# Patient Record
Sex: FEMALE | Race: OTHER | NOT HISPANIC OR LATINO | URBAN - METROPOLITAN AREA
[De-identification: names, ages, dates, MRNs, and addresses within clinical notes are randomized per-mention and may not be internally consistent; named-entity substitution may affect disease eponyms.]

---

## 2018-06-15 ENCOUNTER — OUTPATIENT (OUTPATIENT)
Dept: OUTPATIENT SERVICES | Facility: HOSPITAL | Age: 58
LOS: 1 days | End: 2018-06-15
Payer: COMMERCIAL

## 2018-06-15 VITALS
WEIGHT: 149.91 LBS | SYSTOLIC BLOOD PRESSURE: 150 MMHG | RESPIRATION RATE: 12 BRPM | HEIGHT: 60 IN | DIASTOLIC BLOOD PRESSURE: 80 MMHG | TEMPERATURE: 97 F | OXYGEN SATURATION: 98 % | HEART RATE: 67 BPM

## 2018-06-15 DIAGNOSIS — D25.9 LEIOMYOMA OF UTERUS, UNSPECIFIED: ICD-10-CM

## 2018-06-15 DIAGNOSIS — N85.9 NONINFLAMMATORY DISORDER OF UTERUS, UNSPECIFIED: ICD-10-CM

## 2018-06-15 DIAGNOSIS — K21.9 GASTRO-ESOPHAGEAL REFLUX DISEASE WITHOUT ESOPHAGITIS: ICD-10-CM

## 2018-06-15 LAB
BLD GP AB SCN SERPL QL: NEGATIVE — SIGNIFICANT CHANGE UP
BUN SERPL-MCNC: 13 MG/DL — SIGNIFICANT CHANGE UP (ref 7–23)
CALCIUM SERPL-MCNC: 8.8 MG/DL — SIGNIFICANT CHANGE UP (ref 8.4–10.5)
CHLORIDE SERPL-SCNC: 103 MMOL/L — SIGNIFICANT CHANGE UP (ref 98–107)
CO2 SERPL-SCNC: 25 MMOL/L — SIGNIFICANT CHANGE UP (ref 22–31)
CREAT SERPL-MCNC: 0.88 MG/DL — SIGNIFICANT CHANGE UP (ref 0.5–1.3)
GLUCOSE SERPL-MCNC: 121 MG/DL — HIGH (ref 70–99)
HCG SERPL-ACNC: < 5 MIU/ML — SIGNIFICANT CHANGE UP
HCT VFR BLD CALC: 33.8 % — LOW (ref 34.5–45)
HGB BLD-MCNC: 11.2 G/DL — LOW (ref 11.5–15.5)
MCHC RBC-ENTMCNC: 23.5 PG — LOW (ref 27–34)
MCHC RBC-ENTMCNC: 33.1 % — SIGNIFICANT CHANGE UP (ref 32–36)
MCV RBC AUTO: 70.9 FL — LOW (ref 80–100)
NRBC # FLD: 0 — SIGNIFICANT CHANGE UP
PLATELET # BLD AUTO: 144 K/UL — LOW (ref 150–400)
PMV BLD: SIGNIFICANT CHANGE UP FL (ref 7–13)
POTASSIUM SERPL-MCNC: 3.7 MMOL/L — SIGNIFICANT CHANGE UP (ref 3.5–5.3)
POTASSIUM SERPL-SCNC: 3.7 MMOL/L — SIGNIFICANT CHANGE UP (ref 3.5–5.3)
RBC # BLD: 4.77 M/UL — SIGNIFICANT CHANGE UP (ref 3.8–5.2)
RBC # FLD: 15.9 % — HIGH (ref 10.3–14.5)
RH IG SCN BLD-IMP: POSITIVE — SIGNIFICANT CHANGE UP
SODIUM SERPL-SCNC: 139 MMOL/L — SIGNIFICANT CHANGE UP (ref 135–145)
WBC # BLD: 8.73 K/UL — SIGNIFICANT CHANGE UP (ref 3.8–10.5)
WBC # FLD AUTO: 8.73 K/UL — SIGNIFICANT CHANGE UP (ref 3.8–10.5)

## 2018-06-15 PROCEDURE — 93010 ELECTROCARDIOGRAM REPORT: CPT

## 2018-06-15 RX ORDER — SODIUM CHLORIDE 9 MG/ML
1000 INJECTION, SOLUTION INTRAVENOUS
Qty: 0 | Refills: 0 | Status: DISCONTINUED | OUTPATIENT
Start: 2018-06-22 | End: 2018-06-25

## 2018-06-15 NOTE — H&P PST ADULT - VISION (WITH CORRECTIVE LENSES IF THE PATIENT USUALLY WEARS THEM):
Partially impaired: cannot see medication labels or newsprint, but can see obstacles in path, and the surrounding layout; can count fingers at arm's length/reading glasses
Partially impaired: cannot see medication labels or newsprint, but can see obstacles in path, and the surrounding layout; can count fingers at arm's length/glasses

## 2018-06-15 NOTE — H&P PST ADULT - ASSESSMENT
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

## 2018-06-15 NOTE — H&P PST ADULT - LAB RESULTS AND INTERPRETATION
cbc, bmp, hcg, type & screen done 6/15/18 cbc,  hcg, type & screen done 6/15/18  bmp ; " borderline htn' cbc, bmp,  hcg, type & screen done 6/15/18

## 2018-06-15 NOTE — H&P PST ADULT - INTERPRETATION SERVICES DECLINED
Patient/Caregiver requests family/friend to interpret.
Patient/Caregiver requests family/friend to interpret.

## 2018-06-15 NOTE — H&P PST ADULT - PROBLEM SELECTOR PLAN 1
Procedure as booked ; Abdominal Myomectomy ; pt and daughter states also " surgery on my ovary'  call to surgeon ; s/w Ms Wills; Ms Johann states  procedure is correct as booked. pt and daughter instructed to call surgeon 6/18/18 with questions r/t surgery     pre op instructions reviewed with pt and daughter ; both appear to have a good understanding of pre op instructions

## 2018-06-15 NOTE — H&P PST ADULT - PMH
Anemia    Fibroids    GERD (gastroesophageal reflux disease) Anemia    Endometrial hyperplasia, simple    Fibroids    GERD (gastroesophageal reflux disease)

## 2018-06-15 NOTE — H&P PST ADULT - HISTORY OF PRESENT ILLNESS
Pt is a Pt is a ; information obtained with assistance of daughter; daughter reports menses ; irregular ; flow heavy ; h/o anemia ; pt to surgeon ;a/w/u was done ;there are fibroids ; " they have increased in size  pt now presents for surgery Pt is a 58 y.o. female ; pt primary language is [facility] ;  information obtained with assistance of daughter; daughter reports menses ; irregular ; flow heavy ; h/o anemia ; pt to surgeon ;a/w/u was done ;there are fibroids ; " they have increased in size  pt now presents for surgery ; see plan of care

## 2018-06-15 NOTE — H&P PST ADULT - ATTENDING COMMENTS
patient presented to office with postmenopausal bleeding of several months. Pelvic ultrasound results shows fibroid uterus and Endometrial biopsy reveals endometrial hyperplasia. Pathology results discussed in detail. Planned procedure is JODY/BSO as patient requests definitive therapy. Risks and benefits and alternatives discussed with patient and daughter who is . All questions and concerns addressed to full satisfaction. Anticipate uncomplicated intraop and postop course. MBeagerson

## 2018-06-15 NOTE — H&P PST ADULT - PROBLEM SELECTOR PLAN 2
pt to take omeprazole dos with sip water     Emphasized with pt and daughter " NPO after 11 pm; pt also instructed to remove all piercing pre op

## 2018-06-21 ENCOUNTER — TRANSCRIPTION ENCOUNTER (OUTPATIENT)
Age: 58
End: 2018-06-21

## 2018-06-22 ENCOUNTER — INPATIENT (INPATIENT)
Facility: HOSPITAL | Age: 58
LOS: 2 days | Discharge: HOME | End: 2018-06-25
Attending: OBSTETRICS & GYNECOLOGY | Admitting: OBSTETRICS & GYNECOLOGY
Payer: COMMERCIAL

## 2018-06-22 ENCOUNTER — TRANSCRIPTION ENCOUNTER (OUTPATIENT)
Age: 58
End: 2018-06-22

## 2018-06-22 ENCOUNTER — RESULT REVIEW (OUTPATIENT)
Age: 58
End: 2018-06-22

## 2018-06-22 VITALS
OXYGEN SATURATION: 97 % | SYSTOLIC BLOOD PRESSURE: 156 MMHG | RESPIRATION RATE: 17 BRPM | TEMPERATURE: 98 F | HEART RATE: 69 BPM | HEIGHT: 60 IN | WEIGHT: 149.91 LBS | DIASTOLIC BLOOD PRESSURE: 79 MMHG

## 2018-06-22 DIAGNOSIS — N85.9 NONINFLAMMATORY DISORDER OF UTERUS, UNSPECIFIED: ICD-10-CM

## 2018-06-22 LAB
GLUCOSE BLDC GLUCOMTR-MCNC: 97 MG/DL — SIGNIFICANT CHANGE UP (ref 70–99)
HCG UR QL: NEGATIVE — SIGNIFICANT CHANGE UP
RH IG SCN BLD-IMP: POSITIVE — SIGNIFICANT CHANGE UP

## 2018-06-22 PROCEDURE — 88342 IMHCHEM/IMCYTCHM 1ST ANTB: CPT | Mod: 26

## 2018-06-22 PROCEDURE — 88341 IMHCHEM/IMCYTCHM EA ADD ANTB: CPT | Mod: 26

## 2018-06-22 PROCEDURE — 88307 TISSUE EXAM BY PATHOLOGIST: CPT | Mod: 26

## 2018-06-22 RX ORDER — IBUPROFEN 200 MG
3 TABLET ORAL
Qty: 0 | Refills: 0 | COMMUNITY

## 2018-06-22 RX ORDER — METOCLOPRAMIDE HCL 10 MG
10 TABLET ORAL ONCE
Qty: 0 | Refills: 0 | Status: DISCONTINUED | OUTPATIENT
Start: 2018-06-22 | End: 2018-06-23

## 2018-06-22 RX ORDER — SODIUM CHLORIDE 9 MG/ML
1000 INJECTION, SOLUTION INTRAVENOUS
Qty: 0 | Refills: 0 | Status: DISCONTINUED | OUTPATIENT
Start: 2018-06-22 | End: 2018-06-25

## 2018-06-22 RX ORDER — ACETAMINOPHEN 500 MG
975 TABLET ORAL EVERY 6 HOURS
Qty: 0 | Refills: 0 | Status: DISCONTINUED | OUTPATIENT
Start: 2018-06-22 | End: 2018-06-25

## 2018-06-22 RX ORDER — HYDROMORPHONE HYDROCHLORIDE 2 MG/ML
30 INJECTION INTRAMUSCULAR; INTRAVENOUS; SUBCUTANEOUS
Qty: 0 | Refills: 0 | Status: DISCONTINUED | OUTPATIENT
Start: 2018-06-22 | End: 2018-06-23

## 2018-06-22 RX ORDER — OMEPRAZOLE 10 MG/1
1 CAPSULE, DELAYED RELEASE ORAL
Qty: 0 | Refills: 0 | COMMUNITY

## 2018-06-22 RX ORDER — NALOXONE HYDROCHLORIDE 4 MG/.1ML
0.1 SPRAY NASAL
Qty: 0 | Refills: 0 | Status: DISCONTINUED | OUTPATIENT
Start: 2018-06-22 | End: 2018-06-23

## 2018-06-22 RX ORDER — ONDANSETRON 8 MG/1
4 TABLET, FILM COATED ORAL ONCE
Qty: 0 | Refills: 0 | Status: DISCONTINUED | OUTPATIENT
Start: 2018-06-22 | End: 2018-06-23

## 2018-06-22 RX ORDER — HYDROMORPHONE HYDROCHLORIDE 2 MG/ML
0.5 INJECTION INTRAMUSCULAR; INTRAVENOUS; SUBCUTANEOUS
Qty: 0 | Refills: 0 | Status: DISCONTINUED | OUTPATIENT
Start: 2018-06-22 | End: 2018-06-23

## 2018-06-22 RX ORDER — KETOROLAC TROMETHAMINE 30 MG/ML
30 SYRINGE (ML) INJECTION EVERY 6 HOURS
Qty: 0 | Refills: 0 | Status: DISCONTINUED | OUTPATIENT
Start: 2018-06-22 | End: 2018-06-23

## 2018-06-22 RX ORDER — DIPHENHYDRAMINE HCL 50 MG
25 CAPSULE ORAL EVERY 4 HOURS
Qty: 0 | Refills: 0 | Status: DISCONTINUED | OUTPATIENT
Start: 2018-06-22 | End: 2018-06-23

## 2018-06-22 RX ORDER — ONDANSETRON 8 MG/1
4 TABLET, FILM COATED ORAL EVERY 6 HOURS
Qty: 0 | Refills: 0 | Status: DISCONTINUED | OUTPATIENT
Start: 2018-06-22 | End: 2018-06-23

## 2018-06-22 RX ORDER — HEPARIN SODIUM 5000 [USP'U]/ML
5000 INJECTION INTRAVENOUS; SUBCUTANEOUS EVERY 12 HOURS
Qty: 0 | Refills: 0 | Status: DISCONTINUED | OUTPATIENT
Start: 2018-06-22 | End: 2018-06-25

## 2018-06-22 RX ORDER — FENTANYL CITRATE 50 UG/ML
25 INJECTION INTRAVENOUS
Qty: 0 | Refills: 0 | Status: DISCONTINUED | OUTPATIENT
Start: 2018-06-22 | End: 2018-06-23

## 2018-06-22 RX ADMIN — SODIUM CHLORIDE 125 MILLILITER(S): 9 INJECTION, SOLUTION INTRAVENOUS at 19:14

## 2018-06-22 RX ADMIN — HYDROMORPHONE HYDROCHLORIDE 30 MILLILITER(S): 2 INJECTION INTRAMUSCULAR; INTRAVENOUS; SUBCUTANEOUS at 21:18

## 2018-06-22 RX ADMIN — Medication 30 MILLIGRAM(S): at 23:40

## 2018-06-22 RX ADMIN — Medication 975 MILLIGRAM(S): at 23:40

## 2018-06-22 NOTE — DISCHARGE NOTE ADULT - INSTRUCTIONS
Notify MD if there is fever >101, chills, increase pain unrelieved by pain meds, nausea/vomiting or unable to tolerate foods.  Showering allowed but do not submerge incision under water.   Drink 6-8 glasses of water daily.

## 2018-06-22 NOTE — DISCHARGE NOTE ADULT - CARE PROVIDER_API CALL
Ayanna Mccray (DO), Obstetrics and Gynecology  [address] [place] [address]  Phone: [phone]  Fax: [phone]

## 2018-06-22 NOTE — DISCHARGE NOTE ADULT - CONDITIONS AT DISCHARGE
Vitals stable, tolerated regular diet and voiding without difficulty.  Abd lap site clean, dry and intact.   Pt verbalize all discharge and medication instructions including the need for MD follow up visit.

## 2018-06-22 NOTE — DISCHARGE NOTE ADULT - PATIENT PORTAL LINK FT
You can access the [facility], offered by [facility], by registering with the following website: [email or web address]

## 2018-06-22 NOTE — ASU PATIENT PROFILE, ADULT - INTERPRETER NAME
MSD Chrystal [id number].......

## 2018-06-22 NOTE — ASU PREOP CHECKLIST - 1.
warm blanket given  0.0.0.0.0.0.0.0.0.0.0.0.0.0.0.0.0.0.0.0.0.0.0.0.0.0.0.0.0.0.0.0.0.0.0.0.0.0.0.0.0.0.0.0.0.0.0.0.0.0.0.0.0.0.0.0.0.0.0.0.0.0.0.0.0.0.0.0.0.0.0.0.0.0.0.0.0.0.0.0.0.0.0.0.0.0.0.0.0.0.0.0.0.0.0.0.0.0.0.0.0.0.0.0.0.0.0.0.0.0.0.0.0.0.0.0.0.0.0.0.0.0.0.0.0.0.0.0.0.0.0.0.0.0.0.0.0.0.0.0.0.0.0.0.0.0.0.0.0.0.0.0.0.0.0.0.0.0.0.0.0.0.0.0.0.0.

## 2018-06-22 NOTE — DISCHARGE NOTE ADULT - HOSPITAL COURSE
Patient was admitted for scheduled JODY and BSO.  Please see operative report for details.  The procedure was uncomplicated with an EBL of 650 and the patient tolerated it well. The patient was transferred to the floor in stable condition with PCA for pain control, dee in place and was placed on clear liquid diet.      POD#1 The patient was transitioned to PO pain medication, dee was removed, the patient voided and was advanced to regular diet.  The hct dropped appropriately from ___->___.     On the day of discharge the patient is afebrile and hemodynamically stable. She is ambulating without assistance, voiding spontaneously, and tolerating regular diet. Pain is well controlled on oral medication. She is cleared for discharge with instructions for appropriate follow up. Patient was admitted for scheduled JODY and BSO.  Please see operative report for details.  The procedure was uncomplicated with an EBL of 650 and the patient tolerated it well. The patient was transferred to the floor in stable condition with PCA for pain control, dee in place and was placed on clear liquid diet.      POD#1 The patient was transitioned to PO pain medication, dee was removed, the patient voided and was advanced to regular diet.  Pt's post-operative course was complicated by acute post-operative anemia for which pt was transfused 2uPRBC on POD#2. Pt's H/H increase with blood but continued to slowly downtrend. CTAP performed on POD#2 did not show any sites of bleeding or hematomas. Pt remained clinically stable and asymptomatic throughout this time.    On the day of discharge the patient is afebrile and hemodynamically stable. She is ambulating without assistance, voiding spontaneously, and tolerating regular diet. Pain is well controlled on oral medication. She is cleared for discharge with instructions for appropriate follow up.

## 2018-06-22 NOTE — DISCHARGE NOTE ADULT - MEDICATION SUMMARY - MEDICATIONS TO TAKE
I will START or STAY ON the medications listed below when I get home from the hospital:    oxyCODONE-acetaminophen 5 mg-325 mg oral tablet  -- 1 tab(s) by mouth every 6 hours, As Needed -for severe pain MDD:4 tabs   -- Caution federal law prohibits the transfer of this drug to any person other  than the person for whom it was prescribed.  May cause drowsiness.  Alcohol may intensify this effect.  Use care when operating dangerous machinery.  This prescription cannot be refilled.  This product contains acetaminophen.  Do not use  with any other product containing acetaminophen to prevent possible liver damage.  Using more of this medication than prescribed may cause serious breathing problems.    -- Indication: For Pain    ibuprofen 200 mg oral capsule  -- 3 cap(s) by mouth every 6 hours, As Needed  -- Indication: For Pain    omeprazole 20 mg oral delayed release capsule  -- 1 cap(s) by mouth once a day, As Needed  -- Indication: For home

## 2018-06-22 NOTE — DISCHARGE NOTE ADULT - CARE PLAN
Principal Discharge DX:	Fibroids  Goal:	Wellness  Assessment and plan of treatment:	Regular diet as tolerated, regular activity as tolerated, no heavy lifting for first two weeks.  Take motrin and percocet as needed for pain control.  Do not drive while taking oxycodone.  Nothing per vagina (ie no tampons, douching, or intercourse) until cleared by your doctor.  Shower only, no baths.  Please make an appointment to see your doctor in 2 weeks.  Call your doctor or go to the ED if you have bleeding that does not stop, are unable to urinate, or have a fever >100.4.

## 2018-06-22 NOTE — BRIEF OPERATIVE NOTE - PROCEDURE
<<-----Click on this checkbox to enter Procedure Hysterectomy, abdominal, with BSO  06/22/2018    Active  ECRIHFIELD

## 2018-06-22 NOTE — BRIEF OPERATIVE NOTE - OPERATION/FINDINGS
10w sized uterus, left tube with 3 cm paratubal cyst, right with 1 cm ovarian cyst, otherwise grossly normal tubes and ovaries b/l

## 2018-06-23 DIAGNOSIS — Z98.890 OTHER SPECIFIED POSTPROCEDURAL STATES: ICD-10-CM

## 2018-06-23 LAB
BASOPHILS # BLD AUTO: 0.02 K/UL — SIGNIFICANT CHANGE UP (ref 0–0.2)
BASOPHILS NFR BLD AUTO: 0.2 % — SIGNIFICANT CHANGE UP (ref 0–2)
BUN SERPL-MCNC: 13 MG/DL — SIGNIFICANT CHANGE UP (ref 7–23)
CALCIUM SERPL-MCNC: 7.9 MG/DL — LOW (ref 8.4–10.5)
CHLORIDE SERPL-SCNC: 100 MMOL/L — SIGNIFICANT CHANGE UP (ref 98–107)
CO2 SERPL-SCNC: 25 MMOL/L — SIGNIFICANT CHANGE UP (ref 22–31)
CREAT SERPL-MCNC: 0.6 MG/DL — SIGNIFICANT CHANGE UP (ref 0.5–1.3)
EOSINOPHIL # BLD AUTO: 0 K/UL — SIGNIFICANT CHANGE UP (ref 0–0.5)
EOSINOPHIL NFR BLD AUTO: 0 % — SIGNIFICANT CHANGE UP (ref 0–6)
GLUCOSE SERPL-MCNC: 148 MG/DL — HIGH (ref 70–99)
HCT VFR BLD CALC: 29.1 % — LOW (ref 34.5–45)
HGB BLD-MCNC: 9.3 G/DL — LOW (ref 11.5–15.5)
IMM GRANULOCYTES # BLD AUTO: 0.06 # — SIGNIFICANT CHANGE UP
IMM GRANULOCYTES NFR BLD AUTO: 0.5 % — SIGNIFICANT CHANGE UP (ref 0–1.5)
LYMPHOCYTES # BLD AUTO: 0.99 K/UL — LOW (ref 1–3.3)
LYMPHOCYTES # BLD AUTO: 7.5 % — LOW (ref 13–44)
MAGNESIUM SERPL-MCNC: 1.8 MG/DL — SIGNIFICANT CHANGE UP (ref 1.6–2.6)
MCHC RBC-ENTMCNC: 23.4 PG — LOW (ref 27–34)
MCHC RBC-ENTMCNC: 32 % — SIGNIFICANT CHANGE UP (ref 32–36)
MCV RBC AUTO: 73.1 FL — LOW (ref 80–100)
MONOCYTES # BLD AUTO: 0.88 K/UL — SIGNIFICANT CHANGE UP (ref 0–0.9)
MONOCYTES NFR BLD AUTO: 6.6 % — SIGNIFICANT CHANGE UP (ref 2–14)
NEUTROPHILS # BLD AUTO: 11.29 K/UL — HIGH (ref 1.8–7.4)
NEUTROPHILS NFR BLD AUTO: 85.2 % — HIGH (ref 43–77)
NRBC # FLD: 0 — SIGNIFICANT CHANGE UP
PHOSPHATE SERPL-MCNC: 3.2 MG/DL — SIGNIFICANT CHANGE UP (ref 2.5–4.5)
PLATELET # BLD AUTO: 143 K/UL — LOW (ref 150–400)
PMV BLD: SIGNIFICANT CHANGE UP FL (ref 7–13)
POTASSIUM SERPL-MCNC: 4.1 MMOL/L — SIGNIFICANT CHANGE UP (ref 3.5–5.3)
POTASSIUM SERPL-SCNC: 4.1 MMOL/L — SIGNIFICANT CHANGE UP (ref 3.5–5.3)
RBC # BLD: 3.98 M/UL — SIGNIFICANT CHANGE UP (ref 3.8–5.2)
RBC # FLD: 16.3 % — HIGH (ref 10.3–14.5)
SODIUM SERPL-SCNC: 135 MMOL/L — SIGNIFICANT CHANGE UP (ref 135–145)
WBC # BLD: 13.24 K/UL — HIGH (ref 3.8–10.5)
WBC # FLD AUTO: 13.24 K/UL — HIGH (ref 3.8–10.5)

## 2018-06-23 RX ORDER — OXYCODONE HYDROCHLORIDE 5 MG/1
5 TABLET ORAL EVERY 4 HOURS
Qty: 0 | Refills: 0 | Status: DISCONTINUED | OUTPATIENT
Start: 2018-06-23 | End: 2018-06-25

## 2018-06-23 RX ORDER — PANTOPRAZOLE SODIUM 20 MG/1
40 TABLET, DELAYED RELEASE ORAL DAILY
Qty: 0 | Refills: 0 | Status: DISCONTINUED | OUTPATIENT
Start: 2018-06-23 | End: 2018-06-25

## 2018-06-23 RX ORDER — IBUPROFEN 200 MG
600 TABLET ORAL EVERY 6 HOURS
Qty: 0 | Refills: 0 | Status: DISCONTINUED | OUTPATIENT
Start: 2018-06-23 | End: 2018-06-25

## 2018-06-23 RX ADMIN — Medication 30 MILLIGRAM(S): at 00:17

## 2018-06-23 RX ADMIN — OXYCODONE HYDROCHLORIDE 5 MILLIGRAM(S): 5 TABLET ORAL at 17:18

## 2018-06-23 RX ADMIN — Medication 975 MILLIGRAM(S): at 18:50

## 2018-06-23 RX ADMIN — Medication 975 MILLIGRAM(S): at 05:03

## 2018-06-23 RX ADMIN — Medication 975 MILLIGRAM(S): at 12:38

## 2018-06-23 RX ADMIN — Medication 600 MILLIGRAM(S): at 12:39

## 2018-06-23 RX ADMIN — Medication 975 MILLIGRAM(S): at 00:17

## 2018-06-23 RX ADMIN — Medication 975 MILLIGRAM(S): at 18:27

## 2018-06-23 RX ADMIN — OXYCODONE HYDROCHLORIDE 5 MILLIGRAM(S): 5 TABLET ORAL at 18:30

## 2018-06-23 RX ADMIN — Medication 600 MILLIGRAM(S): at 18:27

## 2018-06-23 RX ADMIN — Medication 600 MILLIGRAM(S): at 18:50

## 2018-06-23 RX ADMIN — HEPARIN SODIUM 5000 UNIT(S): 5000 INJECTION INTRAVENOUS; SUBCUTANEOUS at 18:27

## 2018-06-23 RX ADMIN — Medication 30 MILLIGRAM(S): at 05:04

## 2018-06-23 RX ADMIN — PANTOPRAZOLE SODIUM 40 MILLIGRAM(S): 20 TABLET, DELAYED RELEASE ORAL at 16:28

## 2018-06-23 RX ADMIN — HYDROMORPHONE HYDROCHLORIDE 30 MILLILITER(S): 2 INJECTION INTRAMUSCULAR; INTRAVENOUS; SUBCUTANEOUS at 07:18

## 2018-06-23 RX ADMIN — HYDROMORPHONE HYDROCHLORIDE 30 MILLILITER(S): 2 INJECTION INTRAMUSCULAR; INTRAVENOUS; SUBCUTANEOUS at 02:15

## 2018-06-23 RX ADMIN — Medication 600 MILLIGRAM(S): at 23:34

## 2018-06-23 RX ADMIN — Medication 975 MILLIGRAM(S): at 23:34

## 2018-06-23 RX ADMIN — Medication 975 MILLIGRAM(S): at 13:15

## 2018-06-23 RX ADMIN — HEPARIN SODIUM 5000 UNIT(S): 5000 INJECTION INTRAVENOUS; SUBCUTANEOUS at 05:04

## 2018-06-23 RX ADMIN — Medication 600 MILLIGRAM(S): at 13:15

## 2018-06-23 NOTE — PROGRESS NOTE ADULT - SUBJECTIVE AND OBJECTIVE BOX
Gyn Attending on call: POD#1 s/p JODY/BSO for AUB, full note to follow    Pt seen and examined at bedside. Pt states mild abdominal pain. Pt [  ] ambulating, tolerating ___ diet, [  ] flatus, [  ]BM, [  ] urinating adequately.   Pt denies fever, chills, chest pain, SOB, nausea, vomiting, lightheadedness, dizziness.      T(F): 97.4 (06-23-18 @ 08:07), Max: 98.2 (06-22-18 @ 21:45)  HR: 71 (06-23-18 @ 08:07) (64 - 87)  BP: 121/72 (06-23-18 @ 08:07) (95/63 - 156/79)  RR: 18 (06-23-18 @ 08:07) (14 - 22)  SpO2: 94% (06-23-18 @ 08:07) (94% - 100%)  Wt(kg): --  I&O's Summary    22 Jun 2018 07:01  -  23 Jun 2018 07:00  --------------------------------------------------------  IN: 900 mL / OUT: 545 mL / NET: 355 mL    23 Jun 2018 07:01  -  23 Jun 2018 12:29  --------------------------------------------------------  IN: 625 mL / OUT: 275 mL / NET: 350 mL        MEDICATIONS  (STANDING):  acetaminophen   Tablet. 975 milliGRAM(s) Oral every 6 hours  heparin  Injectable 5000 Unit(s) SubCutaneous every 12 hours  ibuprofen  Tablet 600 milliGRAM(s) Oral every 6 hours  lactated ringers. 1000 milliLiter(s) (125 mL/Hr) IV Continuous <Continuous>  lactated ringers. 1000 milliLiter(s) (30 mL/Hr) IV Continuous <Continuous>    MEDICATIONS  (PRN):  oxyCODONE    IR 5 milliGRAM(s) Oral every 4 hours PRN Moderate Pain (4 - 6)      Physical Exam:  Constitutional: NAD   Abdomen: incision site clean, dry, intact. Soft, mildly tender, [  ] distended, no guarding, no rebound, [  ] bowel sounds  Extremities: no lower extremity edema or calve tenderness. SCDs in place     LABS:                        9.3    13.24 )-----------( 143      ( 23 Jun 2018 05:15 )             29.1     06-23    135  |  100  |  13  ----------------------------<  148<H>  4.1   |  25  |  0.60    Ca    7.9<L>      23 Jun 2018 05:15  Phos  3.2     06-23  Mg     1.8     06-23            RADIOLOGY & ADDITIONAL TESTS: Gyn Attending on call: POD#1 s/p JODY/BSO for AUB, full note    Pt. was seen and examined at bedside. Pt states mild to moderate abdominal pain. Pt is NOT ambulating well, got out of bed once, tolerated breakfast,  NO flatus, NO BM, urinating adequately.   Pt denies fever, chills, chest pain, SOB, nausea, vomiting, lightheadedness, dizziness.      T(F): 97.4 (06-23-18 @ 08:07), Max: 98.2 (06-22-18 @ 21:45)  HR: 71 (06-23-18 @ 08:07) (64 - 87)  BP: 121/72 (06-23-18 @ 08:07) (95/63 - 156/79)  RR: 18 (06-23-18 @ 08:07) (14 - 22)  SpO2: 94% (06-23-18 @ 08:07) (94% - 100%)  Wt(kg): --  I&O's Summary    22 Jun 2018 07:01  -  23 Jun 2018 07:00  --------------------------------------------------------  IN: 900 mL / OUT: 545 mL / NET: 355 mL    23 Jun 2018 07:01  -  23 Jun 2018 12:29  --------------------------------------------------------  IN: 625 mL / OUT: 275 mL / NET: 350 mL        MEDICATIONS  (STANDING):  acetaminophen   Tablet. 975 milliGRAM(s) Oral every 6 hours  heparin  Injectable 5000 Unit(s) SubCutaneous every 12 hours  ibuprofen  Tablet 600 milliGRAM(s) Oral every 6 hours  lactated ringers. 1000 milliLiter(s) (125 mL/Hr) IV Continuous <Continuous>  lactated ringers. 1000 milliLiter(s) (30 mL/Hr) IV Continuous <Continuous>    MEDICATIONS  (PRN):  oxyCODONE    IR 5 milliGRAM(s) Oral every 4 hours PRN Moderate Pain (4 - 6)      Physical Exam:  Constitutional: NAD   CVS: Positive S1S2, RRR  Lungs: CTA B/L, No W/R/R  Abdomen: incision site clean, dry, intact. Soft, mildly tender, [ mild ] distended, no guarding, no rebound, [+] bowel sounds  Extremities: no lower extremity edema or calve tenderness. SCDs in place     LABS:                        9.3    13.24 )-----------( 143      ( 23 Jun 2018 05:15 )             29.1     06-23    135  |  100  |  13  ----------------------------<  148<H>  4.1   |  25  |  0.60    Ca    7.9<L>      23 Jun 2018 05:15  Phos  3.2     06-23  Mg     1.8     06-23            RADIOLOGY & ADDITIONAL TESTS:

## 2018-06-23 NOTE — PROGRESS NOTE ADULT - ASSESSMENT
A/P:     1. POD # 1 s/p A/P:    1. POD#1 s/p JODY/BSO for AUB  2. Mild anemia  3. GERD/Gastritis Hx.    PCA D/Cd  PO Pain meds round the clock  Continue IVF and PO Fluids  VS are stable   Encouraged to eat regular diet  Increase ISM use  Continue current care  Ambulate every 4 hours  Check CBC in am  PO Iron  Surgery explained to patient  Complaining of "heart burn", was on Omiprazole for GERD/Gastritis preop  Will order PPI

## 2018-06-23 NOTE — CHART NOTE - NSCHARTNOTEFT_GEN_A_CORE
GYN Postop Check    Patient seen and examined at bedside, recently post-op. No acute complaints at this time. Reports HA but states pain control adequate at this time. Denies CP, SOB, N/V, fevers, and chills.    Vital Signs Last 24 Hours  T(C): 36.8 (06-22-18 @ 21:45), Max: 36.8 (06-22-18 @ 21:45)  HR: 81 (06-22-18 @ 23:30) (64 - 87)  BP: 95/63 (06-22-18 @ 23:30) (95/63 - 156/79)  RR: 18 (06-22-18 @ 23:30) (14 - 22)  SpO2: 100% (06-22-18 @ 23:30) (94% - 100%)    I&O's Summary    22 Jun 2018 07:01  -  23 Jun 2018 00:17  --------------------------------------------------------  IN: 500 mL / OUT: 220 mL / NET: 280 mL        Physical Exam:  General: NAD  CV: NR, RR  Lungs: CTA-B  Abdomen: Soft, appropriately tender, non-distended  Incision: CDI  Ext: No pain or swelling    Labs:      MEDICATIONS  (STANDING):  acetaminophen   Tablet. 975 milliGRAM(s) Oral every 6 hours  heparin  Injectable 5000 Unit(s) SubCutaneous every 12 hours  HYDROmorphone PCA (1 mG/mL) 30 milliLiter(s) PCA Continuous PCA Continuous  ketorolac   Injectable 30 milliGRAM(s) IV Push every 6 hours  lactated ringers. 1000 milliLiter(s) (125 mL/Hr) IV Continuous <Continuous>  lactated ringers. 1000 milliLiter(s) (30 mL/Hr) IV Continuous <Continuous>    MEDICATIONS  (PRN):  diphenhydrAMINE   Injectable 25 milliGRAM(s) IV Push every 4 hours PRN Pruritus  fentaNYL    Injectable 25 MICROGram(s) IV Push every 5 minutes PRN Mild Pain (1 - 3)  HYDROmorphone  Injectable 0.5 milliGRAM(s) IV Push every 10 minutes PRN Moderate Pain (4 - 6)  HYDROmorphone PCA (1 mG/mL) Rescue Clinician Bolus 0.5 milliGRAM(s) IV Push every 15 minutes PRN for Pain Scale GREATER THAN 6  metoclopramide Injectable 10 milliGRAM(s) IV Push once PRN Nausea and/or Vomiting  naloxone Injectable 0.1 milliGRAM(s) IV Push every 3 minutes PRN For ANY of the following changes in patient status:  A. RR LESS THAN 10 breaths per minute, B. Oxygen saturation LESS THAN 90%, C. Sedation score of 6  ondansetron Injectable 4 milliGRAM(s) IV Push every 6 hours PRN Nausea  ondansetron Injectable 4 milliGRAM(s) IV Push once PRN Nausea and/or Vomiting    A/P: 59yo s/p JODY/BSO, in stable condition.     Neuro: c/w PCA. Tylenol given at this time for headache.   CV: VSS, CBC in AM  Pulm: Saturating well on room air, encourage incentive spirometry  GI: Advance diet as tolerated   : UOP adequate, c/w dee until POD#1  Heme: HSQ and SCDs for DVT ppx  Dispo: Continue routine post-op care    ANTHONY Galarza, PGY-1

## 2018-06-23 NOTE — PROGRESS NOTE ADULT - SUBJECTIVE AND OBJECTIVE BOX
ANESTHESIA POSTOP CHECK    58y Female POSTOP DAY 1 S/P     Vital Signs Last 24 Hrs  T(C): 36.3 (23 Jun 2018 08:07), Max: 36.8 (22 Jun 2018 21:45)  T(F): 97.4 (23 Jun 2018 08:07), Max: 98.2 (22 Jun 2018 21:45)  HR: 71 (23 Jun 2018 08:07) (64 - 87)  BP: 121/72 (23 Jun 2018 08:07) (95/63 - 156/79)  BP(mean): 82 (22 Jun 2018 22:00) (82 - 82)  RR: 18 (23 Jun 2018 08:07) (14 - 22)  SpO2: 94% (23 Jun 2018 08:07) (94% - 100%)  I&O's Summary    22 Jun 2018 07:01  -  23 Jun 2018 07:00  --------------------------------------------------------  IN: 900 mL / OUT: 545 mL / NET: 355 mL        [X ] NO APPARENT ANESTHESIA COMPLICATIONS      Comments:

## 2018-06-23 NOTE — PROGRESS NOTE ADULT - SUBJECTIVE AND OBJECTIVE BOX
R2 GYN PROGRESS NOTE    INTERVAL HPI/OVERNIGHT EVENTS:   Pt seen and examined at bedside.  Pt is w/o complaints this am.  She has not yet been out of bed, she is not yet passing flatus, she is tolerating clear liquids w/ mild nausea.  Dee is in place.  Pain is well controlled w/ PCA.  She denies nausea/vomiting/fever/chills/chest pain/SOB/dizziness.    MEDICATIONS  (STANDING):  acetaminophen   Tablet. 975 milliGRAM(s) Oral every 6 hours  heparin  Injectable 5000 Unit(s) SubCutaneous every 12 hours  ketorolac   Injectable 30 milliGRAM(s) IV Push every 6 hours  lactated ringers. 1000 milliLiter(s) (125 mL/Hr) IV Continuous <Continuous>  lactated ringers. 1000 milliLiter(s) (30 mL/Hr) IV Continuous <Continuous>    MEDICATIONS  (PRN):  oxyCODONE    IR 5 milliGRAM(s) Oral every 4 hours PRN Moderate Pain (4 - 6)      12 point ROS negative except as outlined above    Vital Signs Last 24 Hrs  T(C): 36.3 (23 Jun 2018 08:07), Max: 36.8 (22 Jun 2018 21:45)  T(F): 97.4 (23 Jun 2018 08:07), Max: 98.2 (22 Jun 2018 21:45)  HR: 71 (23 Jun 2018 08:07) (64 - 87)  BP: 121/72 (23 Jun 2018 08:07) (95/63 - 156/79)  BP(mean): 82 (22 Jun 2018 22:00) (82 - 82)  RR: 18 (23 Jun 2018 08:07) (14 - 22)  SpO2: 94% (23 Jun 2018 08:07) (94% - 100%)    I&O's Summary    22 Jun 2018 07:01  -  23 Jun 2018 07:00  --------------------------------------------------------  IN: 900 mL / OUT: 545 mL / NET: 355 mL    PHYSICAL EXAM:    GA: NAD, A+0 x 3  CV: RRR  Pulm: CTA BL  Abd: ( - ) BS, soft, appropriately tender, nondistended, no rebound or guarding,   Incision: clean, dry and intact  : dee in place, scant VB  Extremities: no swelling or calf tenderness    LABS:                          9.3    13.24 )-----------( 143      ( 23 Jun 2018 05:15 )             29.1   baso 0.2    eos 0.0    imm gran 0.5    lymph 7.5    mono 6.6    poly 85.2

## 2018-06-23 NOTE — PROGRESS NOTE ADULT - PROBLEM SELECTOR PLAN 1
NEURO: c/w PCA; likely transition to po pain meds once pt is tolerating reg diet  CV: VSS, H/H stable  PULM: increase incentive spirometry, ambulation  GI: advance diet to regular as tolerated  : d/c leila for good po  HEME: c/w  HSQ, venodynes, increase ambulation  ID: afebrile  DISPO: c/w routine postop care    PEMA Patel MD PGY2

## 2018-06-23 NOTE — PROGRESS NOTE ADULT - SUBJECTIVE AND OBJECTIVE BOX
Anesthesia Pain Management Service    SUBJECTIVE: Patient is doing well with IV PCA and no significant problems reported.    Pain Scale Score	At rest: ___ 	With Activity: ___ 	[X ] Refer to charted pain scores    THERAPY:    [ ] IV PCA Morphine		[ ] 5 mg/mL	[ ] 1 mg/mL  [X ] IV PCA Hydromorphone	[ ] 5 mg/mL	[X ] 1 mg/mL  [ ] IV PCA Fentanyl		[ ] 50 micrograms/mL    Demand dose __0.2_ lockout __6_ (minutes) Continuous Rate _0__ Total: ___  Daily      MEDICATIONS  (STANDING):  acetaminophen   Tablet. 975 milliGRAM(s) Oral every 6 hours  heparin  Injectable 5000 Unit(s) SubCutaneous every 12 hours  ketorolac   Injectable 30 milliGRAM(s) IV Push every 6 hours  lactated ringers. 1000 milliLiter(s) (125 mL/Hr) IV Continuous <Continuous>  lactated ringers. 1000 milliLiter(s) (30 mL/Hr) IV Continuous <Continuous>    MEDICATIONS  (PRN):  oxyCODONE    IR 5 milliGRAM(s) Oral every 4 hours PRN Moderate Pain (4 - 6)      OBJECTIVE:    Sedation Score:	[ X] Alert	[ ] Drowsy 	[ ] Arousable	[ ] Asleep	[ ] Unresponsive    Side Effects:	[X ] None	[ ] Nausea	[ ] Vomiting	[ ] Pruritus  		[ ] Other:    Vital Signs Last 24 Hrs  T(C): 36.3 (23 Jun 2018 08:07), Max: 36.8 (22 Jun 2018 21:45)  T(F): 97.4 (23 Jun 2018 08:07), Max: 98.2 (22 Jun 2018 21:45)  HR: 71 (23 Jun 2018 08:07) (64 - 87)  BP: 121/72 (23 Jun 2018 08:07) (95/63 - 156/79)  BP(mean): 82 (22 Jun 2018 22:00) (82 - 82)  RR: 18 (23 Jun 2018 08:07) (14 - 22)  SpO2: 94% (23 Jun 2018 08:07) (94% - 100%)    ASSESSMENT/ PLAN    Therapy to  be:	[ ] Continue   [ X] Discontinued   [X ] Change to prn Analgesics    Documentation and Verification of current medications:   [X] Done	[ ] Not done, not elligible    Comments: PRN Oral/IV opioids and/or Adjuvant medication to be ordered at this point.

## 2018-06-24 LAB
ANISOCYTOSIS BLD QL: SIGNIFICANT CHANGE UP
APPEARANCE UR: CLEAR — SIGNIFICANT CHANGE UP
APTT BLD: 26.1 SEC — LOW (ref 27.5–37.4)
BACTERIA # UR AUTO: SIGNIFICANT CHANGE UP
BASOPHILS # BLD AUTO: 0.01 K/UL — SIGNIFICANT CHANGE UP (ref 0–0.2)
BASOPHILS # BLD AUTO: 0.03 K/UL — SIGNIFICANT CHANGE UP (ref 0–0.2)
BASOPHILS NFR BLD AUTO: 0.1 % — SIGNIFICANT CHANGE UP (ref 0–2)
BASOPHILS NFR BLD AUTO: 0.2 % — SIGNIFICANT CHANGE UP (ref 0–2)
BASOPHILS NFR SPEC: 0.9 % — SIGNIFICANT CHANGE UP (ref 0–2)
BILIRUB UR-MCNC: NEGATIVE — SIGNIFICANT CHANGE UP
BLD GP AB SCN SERPL QL: NEGATIVE — SIGNIFICANT CHANGE UP
BLOOD UR QL VISUAL: HIGH
COLOR SPEC: SIGNIFICANT CHANGE UP
EOSINOPHIL # BLD AUTO: 0.05 K/UL — SIGNIFICANT CHANGE UP (ref 0–0.5)
EOSINOPHIL # BLD AUTO: 0.07 K/UL — SIGNIFICANT CHANGE UP (ref 0–0.5)
EOSINOPHIL NFR BLD AUTO: 0.4 % — SIGNIFICANT CHANGE UP (ref 0–6)
EOSINOPHIL NFR BLD AUTO: 0.5 % — SIGNIFICANT CHANGE UP (ref 0–6)
EOSINOPHIL NFR FLD: 0 % — SIGNIFICANT CHANGE UP (ref 0–6)
FIBRINOGEN PPP-MCNC: 546.9 MG/DL — HIGH (ref 310–510)
GIANT PLATELETS BLD QL SMEAR: PRESENT — SIGNIFICANT CHANGE UP
GLUCOSE UR-MCNC: NEGATIVE — SIGNIFICANT CHANGE UP
HCT VFR BLD CALC: 15.9 % — CRITICAL LOW (ref 34.5–45)
HCT VFR BLD CALC: 18.2 % — CRITICAL LOW (ref 34.5–45)
HCT VFR BLD CALC: 21.8 % — LOW (ref 34.5–45)
HGB BLD-MCNC: 5.5 G/DL — CRITICAL LOW (ref 11.5–15.5)
HGB BLD-MCNC: 6 G/DL — CRITICAL LOW (ref 11.5–15.5)
HGB BLD-MCNC: 7.4 G/DL — LOW (ref 11.5–15.5)
HYPOCHROMIA BLD QL: SIGNIFICANT CHANGE UP
IMM GRANULOCYTES # BLD AUTO: 0.1 # — SIGNIFICANT CHANGE UP
IMM GRANULOCYTES # BLD AUTO: 0.14 # — SIGNIFICANT CHANGE UP
IMM GRANULOCYTES NFR BLD AUTO: 0.8 % — SIGNIFICANT CHANGE UP (ref 0–1.5)
IMM GRANULOCYTES NFR BLD AUTO: 1.1 % — SIGNIFICANT CHANGE UP (ref 0–1.5)
INR BLD: 0.92 — SIGNIFICANT CHANGE UP (ref 0.88–1.17)
KETONES UR-MCNC: NEGATIVE — SIGNIFICANT CHANGE UP
LEUKOCYTE ESTERASE UR-ACNC: NEGATIVE — SIGNIFICANT CHANGE UP
LYMPHOCYTES # BLD AUTO: 2.83 K/UL — SIGNIFICANT CHANGE UP (ref 1–3.3)
LYMPHOCYTES # BLD AUTO: 2.92 K/UL — SIGNIFICANT CHANGE UP (ref 1–3.3)
LYMPHOCYTES # BLD AUTO: 21.8 % — SIGNIFICANT CHANGE UP (ref 13–44)
LYMPHOCYTES # BLD AUTO: 21.9 % — SIGNIFICANT CHANGE UP (ref 13–44)
LYMPHOCYTES NFR SPEC AUTO: 16.4 % — SIGNIFICANT CHANGE UP (ref 13–44)
MCHC RBC-ENTMCNC: 23.8 PG — LOW (ref 27–34)
MCHC RBC-ENTMCNC: 24 PG — LOW (ref 27–34)
MCHC RBC-ENTMCNC: 25.3 PG — LOW (ref 27–34)
MCHC RBC-ENTMCNC: 33 % — SIGNIFICANT CHANGE UP (ref 32–36)
MCHC RBC-ENTMCNC: 33.9 % — SIGNIFICANT CHANGE UP (ref 32–36)
MCHC RBC-ENTMCNC: 34.6 % — SIGNIFICANT CHANGE UP (ref 32–36)
MCV RBC AUTO: 68.8 FL — LOW (ref 80–100)
MCV RBC AUTO: 72.8 FL — LOW (ref 80–100)
MCV RBC AUTO: 74.4 FL — LOW (ref 80–100)
MICROCYTES BLD QL: SIGNIFICANT CHANGE UP
MONOCYTES # BLD AUTO: 0.93 K/UL — HIGH (ref 0–0.9)
MONOCYTES # BLD AUTO: 1.09 K/UL — HIGH (ref 0–0.9)
MONOCYTES NFR BLD AUTO: 7 % — SIGNIFICANT CHANGE UP (ref 2–14)
MONOCYTES NFR BLD AUTO: 8.4 % — SIGNIFICANT CHANGE UP (ref 2–14)
MONOCYTES NFR BLD: 0 % — LOW (ref 2–9)
NEUTROPHIL AB SER-ACNC: 80 % — HIGH (ref 43–77)
NEUTROPHILS # BLD AUTO: 8.89 K/UL — HIGH (ref 1.8–7.4)
NEUTROPHILS # BLD AUTO: 9.23 K/UL — HIGH (ref 1.8–7.4)
NEUTROPHILS NFR BLD AUTO: 68.5 % — SIGNIFICANT CHANGE UP (ref 43–77)
NEUTROPHILS NFR BLD AUTO: 69.3 % — SIGNIFICANT CHANGE UP (ref 43–77)
NEUTS BAND # BLD: 2.7 % — SIGNIFICANT CHANGE UP (ref 0–6)
NITRITE UR-MCNC: NEGATIVE — SIGNIFICANT CHANGE UP
NON-SQ EPI CELLS # UR AUTO: <1 — SIGNIFICANT CHANGE UP
NRBC # FLD: 0 — SIGNIFICANT CHANGE UP
NRBC # FLD: 0 — SIGNIFICANT CHANGE UP
NRBC # FLD: 0.02 — SIGNIFICANT CHANGE UP
OVALOCYTES BLD QL SMEAR: SLIGHT — SIGNIFICANT CHANGE UP
PH UR: 6.5 — SIGNIFICANT CHANGE UP (ref 4.6–8)
PLATELET # BLD AUTO: 111 K/UL — LOW (ref 150–400)
PLATELET # BLD AUTO: 115 K/UL — LOW (ref 150–400)
PLATELET # BLD AUTO: 123 K/UL — LOW (ref 150–400)
PLATELET COUNT - ESTIMATE: SIGNIFICANT CHANGE UP
PMV BLD: SIGNIFICANT CHANGE UP FL (ref 7–13)
POLYCHROMASIA BLD QL SMEAR: SIGNIFICANT CHANGE UP
PROT UR-MCNC: NEGATIVE MG/DL — SIGNIFICANT CHANGE UP
PROTHROM AB SERPL-ACNC: 10.6 SEC — SIGNIFICANT CHANGE UP (ref 9.8–13.1)
RBC # BLD: 2.31 M/UL — LOW (ref 3.8–5.2)
RBC # BLD: 2.5 M/UL — LOW (ref 3.8–5.2)
RBC # BLD: 2.93 M/UL — LOW (ref 3.8–5.2)
RBC # FLD: 16.1 % — HIGH (ref 10.3–14.5)
RBC # FLD: 16.2 % — HIGH (ref 10.3–14.5)
RBC # FLD: 16.4 % — HIGH (ref 10.3–14.5)
RBC CASTS # UR COMP ASSIST: SIGNIFICANT CHANGE UP (ref 0–?)
RH IG SCN BLD-IMP: POSITIVE — SIGNIFICANT CHANGE UP
SMUDGE CELLS # BLD: PRESENT — SIGNIFICANT CHANGE UP
SP GR SPEC: 1.01 — SIGNIFICANT CHANGE UP (ref 1–1.04)
SQUAMOUS # UR AUTO: SIGNIFICANT CHANGE UP
UROBILINOGEN FLD QL: NORMAL MG/DL — SIGNIFICANT CHANGE UP
WBC # BLD: 11.62 K/UL — HIGH (ref 3.8–10.5)
WBC # BLD: 12.97 K/UL — HIGH (ref 3.8–10.5)
WBC # BLD: 13.32 K/UL — HIGH (ref 3.8–10.5)
WBC # FLD AUTO: 11.62 K/UL — HIGH (ref 3.8–10.5)
WBC # FLD AUTO: 12.97 K/UL — HIGH (ref 3.8–10.5)
WBC # FLD AUTO: 13.32 K/UL — HIGH (ref 3.8–10.5)
WBC UR QL: SIGNIFICANT CHANGE UP (ref 0–?)

## 2018-06-24 PROCEDURE — 74178 CT ABD&PLV WO CNTR FLWD CNTR: CPT | Mod: 26

## 2018-06-24 RX ORDER — ACETAMINOPHEN 500 MG
975 TABLET ORAL ONCE
Qty: 0 | Refills: 0 | Status: COMPLETED | OUTPATIENT
Start: 2018-06-24 | End: 2018-06-24

## 2018-06-24 RX ORDER — MAGNESIUM HYDROXIDE 400 MG/1
30 TABLET, CHEWABLE ORAL DAILY
Qty: 0 | Refills: 0 | Status: DISCONTINUED | OUTPATIENT
Start: 2018-06-24 | End: 2018-06-25

## 2018-06-24 RX ORDER — FERROUS SULFATE 325(65) MG
325 TABLET ORAL THREE TIMES A DAY
Qty: 0 | Refills: 0 | Status: DISCONTINUED | OUTPATIENT
Start: 2018-06-24 | End: 2018-06-25

## 2018-06-24 RX ORDER — ONDANSETRON 8 MG/1
4 TABLET, FILM COATED ORAL ONCE
Qty: 0 | Refills: 0 | Status: COMPLETED | OUTPATIENT
Start: 2018-06-24 | End: 2018-06-24

## 2018-06-24 RX ORDER — DOCUSATE SODIUM 100 MG
100 CAPSULE ORAL THREE TIMES A DAY
Qty: 0 | Refills: 0 | Status: DISCONTINUED | OUTPATIENT
Start: 2018-06-24 | End: 2018-06-25

## 2018-06-24 RX ORDER — DIPHENHYDRAMINE HCL 50 MG
25 CAPSULE ORAL ONCE
Qty: 0 | Refills: 0 | Status: COMPLETED | OUTPATIENT
Start: 2018-06-24 | End: 2018-06-24

## 2018-06-24 RX ORDER — MAGNESIUM HYDROXIDE 400 MG/1
30 TABLET, CHEWABLE ORAL AT BEDTIME
Qty: 0 | Refills: 0 | Status: DISCONTINUED | OUTPATIENT
Start: 2018-06-24 | End: 2018-06-25

## 2018-06-24 RX ORDER — SIMETHICONE 80 MG/1
80 TABLET, CHEWABLE ORAL THREE TIMES A DAY
Qty: 0 | Refills: 0 | Status: DISCONTINUED | OUTPATIENT
Start: 2018-06-24 | End: 2018-06-25

## 2018-06-24 RX ORDER — ASCORBIC ACID 60 MG
500 TABLET,CHEWABLE ORAL DAILY
Qty: 0 | Refills: 0 | Status: DISCONTINUED | OUTPATIENT
Start: 2018-06-24 | End: 2018-06-25

## 2018-06-24 RX ORDER — CALCIUM CARBONATE 500(1250)
1 TABLET ORAL THREE TIMES A DAY
Qty: 0 | Refills: 0 | Status: DISCONTINUED | OUTPATIENT
Start: 2018-06-24 | End: 2018-06-25

## 2018-06-24 RX ADMIN — OXYCODONE HYDROCHLORIDE 5 MILLIGRAM(S): 5 TABLET ORAL at 21:16

## 2018-06-24 RX ADMIN — Medication 1 TABLET(S): at 00:33

## 2018-06-24 RX ADMIN — OXYCODONE HYDROCHLORIDE 5 MILLIGRAM(S): 5 TABLET ORAL at 09:14

## 2018-06-24 RX ADMIN — PANTOPRAZOLE SODIUM 40 MILLIGRAM(S): 20 TABLET, DELAYED RELEASE ORAL at 12:15

## 2018-06-24 RX ADMIN — Medication 500 MILLIGRAM(S): at 12:16

## 2018-06-24 RX ADMIN — Medication 100 MILLIGRAM(S): at 13:34

## 2018-06-24 RX ADMIN — Medication 975 MILLIGRAM(S): at 12:15

## 2018-06-24 RX ADMIN — Medication 25 MILLIGRAM(S): at 10:34

## 2018-06-24 RX ADMIN — OXYCODONE HYDROCHLORIDE 5 MILLIGRAM(S): 5 TABLET ORAL at 16:24

## 2018-06-24 RX ADMIN — Medication 600 MILLIGRAM(S): at 23:09

## 2018-06-24 RX ADMIN — OXYCODONE HYDROCHLORIDE 5 MILLIGRAM(S): 5 TABLET ORAL at 21:50

## 2018-06-24 RX ADMIN — Medication 100 MILLIGRAM(S): at 21:16

## 2018-06-24 RX ADMIN — ONDANSETRON 4 MILLIGRAM(S): 8 TABLET, FILM COATED ORAL at 08:26

## 2018-06-24 RX ADMIN — SIMETHICONE 80 MILLIGRAM(S): 80 TABLET, CHEWABLE ORAL at 21:16

## 2018-06-24 RX ADMIN — Medication 325 MILLIGRAM(S): at 13:34

## 2018-06-24 RX ADMIN — Medication 600 MILLIGRAM(S): at 12:15

## 2018-06-24 RX ADMIN — Medication 975 MILLIGRAM(S): at 23:09

## 2018-06-24 RX ADMIN — SIMETHICONE 80 MILLIGRAM(S): 80 TABLET, CHEWABLE ORAL at 13:34

## 2018-06-24 RX ADMIN — Medication 1 TABLET(S): at 10:34

## 2018-06-24 RX ADMIN — Medication 600 MILLIGRAM(S): at 05:37

## 2018-06-24 RX ADMIN — HEPARIN SODIUM 5000 UNIT(S): 5000 INJECTION INTRAVENOUS; SUBCUTANEOUS at 05:37

## 2018-06-24 RX ADMIN — Medication 325 MILLIGRAM(S): at 21:16

## 2018-06-24 RX ADMIN — OXYCODONE HYDROCHLORIDE 5 MILLIGRAM(S): 5 TABLET ORAL at 15:54

## 2018-06-24 RX ADMIN — Medication 975 MILLIGRAM(S): at 17:23

## 2018-06-24 RX ADMIN — OXYCODONE HYDROCHLORIDE 5 MILLIGRAM(S): 5 TABLET ORAL at 09:44

## 2018-06-24 RX ADMIN — HEPARIN SODIUM 5000 UNIT(S): 5000 INJECTION INTRAVENOUS; SUBCUTANEOUS at 17:22

## 2018-06-24 RX ADMIN — SODIUM CHLORIDE 125 MILLILITER(S): 9 INJECTION, SOLUTION INTRAVENOUS at 23:09

## 2018-06-24 RX ADMIN — Medication 975 MILLIGRAM(S): at 06:10

## 2018-06-24 RX ADMIN — Medication 600 MILLIGRAM(S): at 06:10

## 2018-06-24 RX ADMIN — Medication 600 MILLIGRAM(S): at 17:23

## 2018-06-24 RX ADMIN — MAGNESIUM HYDROXIDE 30 MILLILITER(S): 400 TABLET, CHEWABLE ORAL at 12:14

## 2018-06-24 RX ADMIN — Medication 1 TABLET(S): at 21:16

## 2018-06-24 RX ADMIN — Medication 975 MILLIGRAM(S): at 05:37

## 2018-06-24 NOTE — PROGRESS NOTE ADULT - SUBJECTIVE AND OBJECTIVE BOX
Attending Note  Patient evaluated at bedside. Patient currently on first unit of PRBC's. Denies shortness of breath, chest pain, palpitations, dysuria. Patient does admit to feeling a little weak. +flatus. Tolerating regular  diet but states family is bring her home made food which she is used to. Out of bed one earlier today.  No vaginal bleeding.    Heent: Appears in NAD  CV RRR  Abd soft no rigidity Incision C/D/I  Ext no CCE  Neuro AAO x 3

## 2018-06-24 NOTE — PROGRESS NOTE ADULT - SUBJECTIVE AND OBJECTIVE BOX
Gyn Attending: POD#2 s/p JODY/BSO (Full note to follow)    Pt seen and examined at bedside. Pt states mild abdominal pain. Pt [  ] ambulating, tolerating ___ diet, [  ] flatus, [  ]BM, [  ] urinating adequately.   Pt denies fever, chills, chest pain, SOB, nausea, vomiting, lightheadedness, dizziness.      T(F): 98 (06-24-18 @ 05:46), Max: 98.3 (06-24-18 @ 01:51)  HR: 77 (06-24-18 @ 05:46) (77 - 83)  BP: 110/71 (06-24-18 @ 05:46) (107/59 - 136/75)  RR: 18 (06-24-18 @ 05:46) (17 - 18)  SpO2: 98% (06-24-18 @ 05:46) (94% - 98%)  Wt(kg): --  I&O's Summary    23 Jun 2018 07:01  -  24 Jun 2018 07:00  --------------------------------------------------------  IN: 2875 mL / OUT: 1485 mL / NET: 1390 mL        MEDICATIONS  (STANDING):  acetaminophen   Tablet. 975 milliGRAM(s) Oral every 6 hours  calcium carbonate 500 mG (Tums) Chewable 1 Tablet(s) Chew three times a day  heparin  Injectable 5000 Unit(s) SubCutaneous every 12 hours  ibuprofen  Tablet 600 milliGRAM(s) Oral every 6 hours  lactated ringers. 1000 milliLiter(s) (125 mL/Hr) IV Continuous <Continuous>  lactated ringers. 1000 milliLiter(s) (30 mL/Hr) IV Continuous <Continuous>  pantoprazole    Tablet 40 milliGRAM(s) Oral daily    MEDICATIONS  (PRN):  oxyCODONE    IR 5 milliGRAM(s) Oral every 4 hours PRN Moderate Pain (4 - 6)      Physical Exam:  Constitutional: NAD   Abdomen: incision site clean, dry, intact. Soft, mildly tender, [  ] distended, no guarding, no rebound, [  ] bowel sounds  Extremities: no lower extremity edema or calve tenderness. SCDs in place     LABS:                        5.5    12.97 )-----------( 123      ( 24 Jun 2018 06:15 )             15.9     06-23    135  |  100  |  13  ----------------------------<  148<H>  4.1   |  25  |  0.60    Ca    7.9<L>      23 Jun 2018 05:15  Phos  3.2     06-23  Mg     1.8     06-23            RADIOLOGY & ADDITIONAL TESTS: Gyn Attending: POD#2 s/p JODY/BSO (Full note)    Pt seen and examined at bedside. Pt states mild to moderate abdominal pain. Pt [ is ] ambulating, tolerating _regular__ diet, [ + ] flatus, [ NO ] BM, urinating adequately.   Patient's H/H is 5.5/15.9. She does have dizziness when she walks and feeling tired and fatigued. Ambulated this am. Pt denies fever, chills, chest pain, SOB, nausea, vomiting, or calf pain.    T(F): 98 (06-24-18 @ 05:46), Max: 98.3 (06-24-18 @ 01:51)  HR: 77 (06-24-18 @ 05:46) (77 - 83)  BP: 110/71 (06-24-18 @ 05:46) (107/59 - 136/75)  RR: 18 (06-24-18 @ 05:46) (17 - 18)  SpO2: 98% (06-24-18 @ 05:46) (94% - 98%)  Wt(kg): --  I&O's Summary    23 Jun 2018 07:01  -  24 Jun 2018 07:00  --------------------------------------------------------  IN: 2875 mL / OUT: 1485 mL / NET: 1390 mL      MEDICATIONS  (STANDING):  acetaminophen   Tablet. 975 milliGRAM(s) Oral every 6 hours  calcium carbonate 500 mG (Tums) Chewable 1 Tablet(s) Chew three times a day  heparin  Injectable 5000 Unit(s) SubCutaneous every 12 hours  ibuprofen  Tablet 600 milliGRAM(s) Oral every 6 hours  lactated ringers. 1000 milliLiter(s) (125 mL/Hr) IV Continuous <Continuous>  lactated ringers. 1000 milliLiter(s) (30 mL/Hr) IV Continuous <Continuous>  pantoprazole    Tablet 40 milliGRAM(s) Oral daily    MEDICATIONS  (PRN):  oxyCODONE    IR 5 milliGRAM(s) Oral every 4 hours PRN Moderate Pain (4 - 6)      Physical Exam:  Constitutional: NAD, AAO x 3  CVS: Positive S1S2, RRR  Lungs: CTA B/L, No W/R/R  Abdomen: incision site clean, dry, intact. Soft, mildly tender, mildly distended, no guarding, no rebound, [ + ] bowel sounds but hypo.  Extremities: no lower extremity edema or calve tenderness.     LABS:                        5.5    12.97 )-----------( 123      ( 24 Jun 2018 06:15 )             15.9     06-23    135  |  100  |  13  ----------------------------<  148<H>  4.1   |  25  |  0.60    Ca    7.9<L>      23 Jun 2018 05:15  Phos  3.2     06-23  Mg     1.8     06-23            RADIOLOGY & ADDITIONAL TESTS:

## 2018-06-24 NOTE — PROGRESS NOTE ADULT - SUBJECTIVE AND OBJECTIVE BOX
Patient seen and examined at bedside. She states that she continues to have lower abdominal pain that is improved with pain medication. She states pain is 2/2 to gas. She additionally feels lightheaded and dizzy when ambulating. She is passing flatus. Voiding spontaneously and tolerating regular diet. Denies CP, SOB, N/V, fevers, and chills.    Vital Signs Last 24 Hours  T(C): 36.9 (06-24-18 @ 17:45), Max: 36.9 (06-24-18 @ 09:56)  HR: 87 (06-24-18 @ 17:45) (77 - 87)  BP: 114/70 (06-24-18 @ 17:45) (107/59 - 130/76)  RR: 16 (06-24-18 @ 17:45) (16 - 18)  SpO2: 100% (06-24-18 @ 17:45) (95% - 100%)    I&O's Summary    23 Jun 2018 07:01  -  24 Jun 2018 07:00  --------------------------------------------------------  IN: 2875 mL / OUT: 1485 mL / NET: 1390 mL    24 Jun 2018 07:01  -  24 Jun 2018 19:44  --------------------------------------------------------  IN: 600 mL / OUT: 1650 mL / NET: -1050 mL        Physical Exam:  General: NAD  CV: RRR  Lungs: CTAB  Abdomen: firm in lower abdomina, appropriately tender, distended, +BS  Incision: Pfannenstiel C/D/I  Ext: No pain or swelling    Labs:                        6.0    13.32 )-----------( 115      ( 24 Jun 2018 09:18 )             18.2   baso 0.2    eos 0.5    imm gran 1.1    lymph 21.9   mono 7.0    poly 69.3                         5.5    12.97 )-----------( 123      ( 24 Jun 2018 06:15 )             15.9   baso 0.1    eos 0.4    imm gran 0.8    lymph 21.8   mono 8.4    poly 68.5                         9.3    13.24 )-----------( 143      ( 23 Jun 2018 05:15 )             29.1   baso 0.2    eos 0.0    imm gran 0.5    lymph 7.5    mono 6.6    poly 85.2       MEDICATIONS  (STANDING):  acetaminophen   Tablet. 975 milliGRAM(s) Oral every 6 hours  ascorbic acid 500 milliGRAM(s) Oral daily  calcium carbonate 500 mG (Tums) Chewable 1 Tablet(s) Chew three times a day  docusate sodium 100 milliGRAM(s) Oral three times a day  ferrous    sulfate 325 milliGRAM(s) Oral three times a day  heparin  Injectable 5000 Unit(s) SubCutaneous every 12 hours  ibuprofen  Tablet 600 milliGRAM(s) Oral every 6 hours  lactated ringers. 1000 milliLiter(s) (125 mL/Hr) IV Continuous <Continuous>  lactated ringers. 1000 milliLiter(s) (30 mL/Hr) IV Continuous <Continuous>  magnesium hydroxide Suspension 30 milliLiter(s) Oral at bedtime  magnesium hydroxide Suspension 30 milliLiter(s) Oral daily  pantoprazole    Tablet 40 milliGRAM(s) Oral daily  simethicone 80 milliGRAM(s) Chew three times a day    MEDICATIONS  (PRN):  oxyCODONE    IR 5 milliGRAM(s) Oral every 4 hours PRN Moderate Pain (4 - 6)

## 2018-06-24 NOTE — PROGRESS NOTE ADULT - SUBJECTIVE AND OBJECTIVE BOX
R2 GYN PROGRESS NOTE    INTERVAL HPI/OVERNIGHT EVENTS:   No overnight events.  Pt seen and examined at bedside.  Pt complains of mild nausea and is only drinking water; she hasn't felt like eating but feels hungry for breakfast now.  She is voiding spontaneously but also complains of mild dysuria, she denies urgency/frequency.  She is ambulating, passing flatus, and pain controlled with analgesia.  She has not had a BM.  She denies vomiting/fever/chills/chest pain/SOB/dizziness.    MEDICATIONS  (STANDING):  acetaminophen   Tablet. 975 milliGRAM(s) Oral every 6 hours  calcium carbonate 500 mG (Tums) Chewable 1 Tablet(s) Chew three times a day  heparin  Injectable 5000 Unit(s) SubCutaneous every 12 hours  ibuprofen  Tablet 600 milliGRAM(s) Oral every 6 hours  lactated ringers. 1000 milliLiter(s) (125 mL/Hr) IV Continuous <Continuous>  lactated ringers. 1000 milliLiter(s) (30 mL/Hr) IV Continuous <Continuous>  pantoprazole    Tablet 40 milliGRAM(s) Oral daily    MEDICATIONS  (PRN):  oxyCODONE    IR 5 milliGRAM(s) Oral every 4 hours PRN Moderate Pain (4 - 6)    12 point ROS negative except as outlined above    Vital Signs Last 24 Hrs  T(C): 36.7 (24 Jun 2018 05:46), Max: 36.8 (24 Jun 2018 01:51)  T(F): 98 (24 Jun 2018 05:46), Max: 98.3 (24 Jun 2018 01:51)  HR: 77 (24 Jun 2018 05:46) (77 - 83)  BP: 110/71 (24 Jun 2018 05:46) (107/59 - 136/75)  RR: 18 (24 Jun 2018 05:46) (17 - 18)  SpO2: 98% (24 Jun 2018 05:46) (94% - 98%)    I&O's Summary    23 Jun 2018 07:01  -  24 Jun 2018 07:00  --------------------------------------------------------  IN: 2875 mL / OUT: 1485 mL / NET: 1390 mL        PHYSICAL EXAM:    GA: NAD, A+0 x 3  CV: RRR  Pulm: CTA BL  Abd: ( + ) BS, soft, nontender, moderately distended, no rebound or guarding,   Incision: clean, dry and intact; steri-strips in place  : no blood on pad  Extremities: no swelling or calf tenderness, reflexes +2 bilaterally      LABS:                          5.5    12.97 )-----------( 123      ( 24 Jun 2018 06:15 )             15.9   baso 0.1    eos 0.4    imm gran 0.8    lymph 21.8   mono 8.4    poly 68.5                         9.3    13.24 )-----------( 143      ( 23 Jun 2018 05:15 )             29.1   baso 0.2    eos 0.0    imm gran 0.5    lymph 7.5    mono 6.6    poly 85.2 R2 GYN PROGRESS NOTE    INTERVAL HPI/OVERNIGHT EVENTS:   No overnight events.  Pt seen and examined at bedside.  Pt complains of mild nausea and dizziness and is only drinking water; she hasn't felt like eating but feels hungry for breakfast now.  She is voiding spontaneously but also complains of mild dysuria, she denies urgency/frequency.  She is ambulating, passing flatus, and pain controlled with analgesia.  She has not had a BM.  She denies vomiting/fever/chills/chest pain/SOB.    MEDICATIONS  (STANDING):  acetaminophen   Tablet. 975 milliGRAM(s) Oral every 6 hours  calcium carbonate 500 mG (Tums) Chewable 1 Tablet(s) Chew three times a day  heparin  Injectable 5000 Unit(s) SubCutaneous every 12 hours  ibuprofen  Tablet 600 milliGRAM(s) Oral every 6 hours  lactated ringers. 1000 milliLiter(s) (125 mL/Hr) IV Continuous <Continuous>  lactated ringers. 1000 milliLiter(s) (30 mL/Hr) IV Continuous <Continuous>  pantoprazole    Tablet 40 milliGRAM(s) Oral daily    MEDICATIONS  (PRN):  oxyCODONE    IR 5 milliGRAM(s) Oral every 4 hours PRN Moderate Pain (4 - 6)    12 point ROS negative except as outlined above    Vital Signs Last 24 Hrs  T(C): 36.7 (24 Jun 2018 05:46), Max: 36.8 (24 Jun 2018 01:51)  T(F): 98 (24 Jun 2018 05:46), Max: 98.3 (24 Jun 2018 01:51)  HR: 77 (24 Jun 2018 05:46) (77 - 83)  BP: 110/71 (24 Jun 2018 05:46) (107/59 - 136/75)  RR: 18 (24 Jun 2018 05:46) (17 - 18)  SpO2: 98% (24 Jun 2018 05:46) (94% - 98%)    I&O's Summary    23 Jun 2018 07:01  -  24 Jun 2018 07:00  --------------------------------------------------------  IN: 2875 mL / OUT: 1485 mL / NET: 1390 mL        PHYSICAL EXAM:    GA: NAD, A+0 x 3  CV: RRR  Pulm: CTA BL  Abd: ( + ) BS, soft, nontender, moderately distended, no rebound or guarding,   Incision: clean, dry and intact; steri-strips in place  : no blood on pad  Extremities: no swelling or calf tenderness, reflexes +2 bilaterally      LABS:                          5.5    12.97 )-----------( 123      ( 24 Jun 2018 06:15 )             15.9   baso 0.1    eos 0.4    imm gran 0.8    lymph 21.8   mono 8.4    poly 68.5                         9.3    13.24 )-----------( 143      ( 23 Jun 2018 05:15 )             29.1   baso 0.2    eos 0.0    imm gran 0.5    lymph 7.5    mono 6.6    poly 85.2

## 2018-06-24 NOTE — PROGRESS NOTE ADULT - ASSESSMENT
A/P:     1. POD#2 s/p JODY/BSO  2. Anemia (Severe)    H/H 5.5/15.9  STAT CBC  T&C for 2U PRBC and Transfuse A/P:     1. POD#2 s/p JODY/BSO for AUB  2. Anemia (Severe)    H/H 5.5/15.9  STAT CBC  T&C for 2U PRBC and Transfuse  Pt. is symptomatic of anemia  Continue IVF  Colace PO TID  Simethicone Q8 hrs  Fast scan of the abdomen for any intraop bleeding

## 2018-06-24 NOTE — PROGRESS NOTE ADULT - PROBLEM SELECTOR PLAN 1
NEURO: c/w tylenol/motrin, oxy prn  CV: VSS and pt asx; AM CBC likely diluted, will redraw stat  PULM: increase incentive spirometry, ambulation  GI: advance diet to regular this am; zofran prn nausea, mylicon prn gas  : pt voiding but w/ dysuria, stat UA  HEME: c/w HSQ, venodynes, increase ambulation  ID: afebrile  DISPO: c/w routine postop care    PEMA Patel MD PGY2. NEURO: c/w tylenol/motrin, oxy prn  CV: VSS and pt asx;  AM CBC w/ H/H 5.5/15.5.  Per RN the blood was not drawn off the line. Pt dizzy and abdomen distended.  stat repeat CBC.  FAST Scan.    PULM: increase incentive spirometry, ambulation  GI: advance diet to regular this am; zofran prn nausea, mylicon prn gas  : pt voiding but w/ dysuria, stat UA  HEME: c/w HSQ, venodynes, increase ambulation  ID: afebrile  DISPO: c/w routine postop care    PEMA Patel MD PGY2.

## 2018-06-24 NOTE — CHART NOTE - NSCHARTNOTEFT_GEN_A_CORE
Gyn Attending on call: PT. SPEAKS [place]ian and I SPEAK THE SAME LANGUAGE    Called in to talk to patient as she is refusing Blood Transfusion as her family (Son-in-law and her daughter) advised her nor to get blood transfusion.    57 yo female s/p JODY/BSO for AUB, and Endometrial Hyperplasia now POD# 2 with Severe Anemia.    Hb was 9.3/29.1 on POD#1 then 5.5/15.9 on POD# 2 today in am around 6:00 am  Repaet H/S was 6.0/18.2 with platelets 115    Labs discussed with patient this morning during rounds. She has symptoms of dizziness and fatigue and weakness.  VS are stable : 98.4, HR 70s to 84s, 114/60, 18    Abdomen: Soft, mildly distended, mild tenderness, hypoactive BS    A/P: POD#2 JODY/BSO, Anemia    VS stable   Pt. denies any C/P, SOB, N/V, positive mild flatus  I spoke to patient again  Explained that she has symptoms from the anemia and it would be better to have blood transfusion  Risk of severe anemia with syncope, heart failure or MI even death discussed  Pt. said, her daughter was concerned about infection  Advised patient that the blood is screened for any infection and risk of Hep B or Hep C is also very low (1: 35,000-45,000) and risk of HIV is less then 1:100,000  Pt. understands the risk and she desires Transfusion  Meanwhile I tried calling her son-in-law at [phone], did not answer, left message  Her daughter then called at the nursing station, and I spoke to her  She wanted to know the Hb level and when I told her it was 5.5 then 6.0, she agreed with blood transfusion.  Will consider CT (A/P) if continues to have abdominal distension after the blood transfusion

## 2018-06-24 NOTE — PROGRESS NOTE ADULT - ASSESSMENT
57y/o POD#2 from _ in stable condition. 59y/o POD#2 from [facility], BSO complicated by downtrending H/H s/p 2 uPRBCs

## 2018-06-24 NOTE — PROGRESS NOTE ADULT - PROBLEM SELECTOR PLAN 1
- CV: 8pm CBC,   - Resp: encourage incentive spirometer  - Abd: f/u CTAP final read, mylicon for gas pain  - Neuro: continue motrin, tylenol, oxycodone for pain  - Heme: HSQ for anticoagulation    d/w Dr. Shazia Woodward pgy2

## 2018-06-25 VITALS
SYSTOLIC BLOOD PRESSURE: 125 MMHG | HEART RATE: 74 BPM | RESPIRATION RATE: 18 BRPM | TEMPERATURE: 98 F | OXYGEN SATURATION: 100 % | DIASTOLIC BLOOD PRESSURE: 71 MMHG

## 2018-06-25 LAB
ANISOCYTOSIS BLD QL: SLIGHT — SIGNIFICANT CHANGE UP
BASOPHILS NFR SPEC: 0.9 % — SIGNIFICANT CHANGE UP (ref 0–2)
ELLIPTOCYTES BLD QL SMEAR: SLIGHT — SIGNIFICANT CHANGE UP
EOSINOPHIL NFR FLD: 0 % — SIGNIFICANT CHANGE UP (ref 0–6)
GIANT PLATELETS BLD QL SMEAR: PRESENT — SIGNIFICANT CHANGE UP
HCT VFR BLD CALC: 20 % — CRITICAL LOW (ref 34.5–45)
HGB BLD-MCNC: 6.8 G/DL — CRITICAL LOW (ref 11.5–15.5)
LYMPHOCYTES NFR SPEC AUTO: 11.8 % — LOW (ref 13–44)
MCHC RBC-ENTMCNC: 25.7 PG — LOW (ref 27–34)
MCHC RBC-ENTMCNC: 34 % — SIGNIFICANT CHANGE UP (ref 32–36)
MCV RBC AUTO: 75.5 FL — LOW (ref 80–100)
MICROCYTES BLD QL: SLIGHT — SIGNIFICANT CHANGE UP
MONOCYTES NFR BLD: 3.7 % — SIGNIFICANT CHANGE UP (ref 2–9)
NEUTROPHIL AB SER-ACNC: 82.7 % — HIGH (ref 43–77)
NRBC # FLD: 0 — SIGNIFICANT CHANGE UP
OVALOCYTES BLD QL SMEAR: SLIGHT — SIGNIFICANT CHANGE UP
PLATELET # BLD AUTO: 100 K/UL — LOW (ref 150–400)
PLATELET COUNT - ESTIMATE: SIGNIFICANT CHANGE UP
PMV BLD: SIGNIFICANT CHANGE UP FL (ref 7–13)
POIKILOCYTOSIS BLD QL AUTO: SLIGHT — SIGNIFICANT CHANGE UP
RBC # BLD: 2.65 M/UL — LOW (ref 3.8–5.2)
RBC # FLD: 16.2 % — HIGH (ref 10.3–14.5)
SCHISTOCYTES BLD QL AUTO: SLIGHT — SIGNIFICANT CHANGE UP
SMUDGE CELLS # BLD: PRESENT — SIGNIFICANT CHANGE UP
VARIANT LYMPHS # BLD: 0.9 % — SIGNIFICANT CHANGE UP
WBC # BLD: 10.52 K/UL — HIGH (ref 3.8–10.5)
WBC # FLD AUTO: 10.52 K/UL — HIGH (ref 3.8–10.5)

## 2018-06-25 RX ADMIN — Medication 975 MILLIGRAM(S): at 12:35

## 2018-06-25 RX ADMIN — Medication 600 MILLIGRAM(S): at 05:07

## 2018-06-25 RX ADMIN — Medication 975 MILLIGRAM(S): at 05:07

## 2018-06-25 RX ADMIN — HEPARIN SODIUM 5000 UNIT(S): 5000 INJECTION INTRAVENOUS; SUBCUTANEOUS at 05:06

## 2018-06-25 RX ADMIN — Medication 100 MILLIGRAM(S): at 05:07

## 2018-06-25 RX ADMIN — Medication 325 MILLIGRAM(S): at 05:07

## 2018-06-25 RX ADMIN — PANTOPRAZOLE SODIUM 40 MILLIGRAM(S): 20 TABLET, DELAYED RELEASE ORAL at 12:36

## 2018-06-25 RX ADMIN — Medication 600 MILLIGRAM(S): at 13:15

## 2018-06-25 RX ADMIN — SIMETHICONE 80 MILLIGRAM(S): 80 TABLET, CHEWABLE ORAL at 05:06

## 2018-06-25 RX ADMIN — Medication 1 TABLET(S): at 05:06

## 2018-06-25 RX ADMIN — Medication 975 MILLIGRAM(S): at 13:15

## 2018-06-25 RX ADMIN — Medication 500 MILLIGRAM(S): at 12:35

## 2018-06-25 RX ADMIN — Medication 600 MILLIGRAM(S): at 12:36

## 2018-06-25 NOTE — PROGRESS NOTE ADULT - ASSESSMENT
58y female POD#3, s/p JODY-BSO with post-operative course c/b acute anemia, now s/p 2uPRBC. Pt is hemodynamically stable and asymptomatic.

## 2018-06-25 NOTE — PROGRESS NOTE ADULT - SUBJECTIVE AND OBJECTIVE BOX
Gyn Progress Note POD #3    Subjective:   Pt seen and examined at bedside. No events overnight. Pain well controlled. Patient ambulating. Passing flatus. Tolerating regular diet. Pt denies fever, chills, chest pain, SOB, nausea, vomiting, dizziness, Pt notes that she occasionally gets lightheaded when ambulating.    Objective:  T(F): 97.9 (18 @ 05:08), Max: 98.4 (18 @ 09:56)  HR: 79 (18 @ 05:08) (79 - 92)  BP: 103/62 (18 @ 05:08) (102/63 - 130/76)  RR: 18 (18 @ 05:08) (16 - 18)  SpO2: 97% (18 @ 05:08) (95% - 100%)  I&O's Summary    2018 07:01  -  2018 07:00  --------------------------------------------------------  IN: 2100 mL / OUT: 1950 mL / NET: 150 mL      CAPILLARY BLOOD GLUCOSE          MEDICATIONS  (STANDING):  acetaminophen   Tablet. 975 milliGRAM(s) Oral every 6 hours  ascorbic acid 500 milliGRAM(s) Oral daily  calcium carbonate 500 mG (Tums) Chewable 1 Tablet(s) Chew three times a day  docusate sodium 100 milliGRAM(s) Oral three times a day  ferrous    sulfate 325 milliGRAM(s) Oral three times a day  heparin  Injectable 5000 Unit(s) SubCutaneous every 12 hours  ibuprofen  Tablet 600 milliGRAM(s) Oral every 6 hours  lactated ringers. 1000 milliLiter(s) (125 mL/Hr) IV Continuous <Continuous>  lactated ringers. 1000 milliLiter(s) (30 mL/Hr) IV Continuous <Continuous>  magnesium hydroxide Suspension 30 milliLiter(s) Oral at bedtime  magnesium hydroxide Suspension 30 milliLiter(s) Oral daily  pantoprazole    Tablet 40 milliGRAM(s) Oral daily  simethicone 80 milliGRAM(s) Chew three times a day    MEDICATIONS  (PRN):  oxyCODONE    IR 5 milliGRAM(s) Oral every 4 hours PRN Moderate Pain (4 - 6)      Physical Exam:  Constitutional: NAD, A+O x3  CV: RRR  Lungs: clear to auscultation bilaterally  Abdomen: soft, moderately distended, no guarding, no rebound, normal bowel sounds, firm to palpation around incision site  Incision: Pfannenstiel skin incision - clean/dry/steri strips intact, bruising noted anterior to incision  Extremities: no lower extremity edema or calf tenderness bilaterally; venodynes in place    LABS:      PT/INR - ( 2018 10:20 )   PT: 10.6 SEC;   INR: 0.92          PTT - ( 2018 10:20 )  PTT:26.1 SEC  Urinalysis Basic - ( 2018 09:30 )    Color: PLYEL / Appearance: CLEAR / S.007 / pH: 6.5  Gluc: NEGATIVE / Ketone: NEGATIVE  / Bili: NEGATIVE / Urobili: NORMAL mg/dL   Blood: LARGE / Protein: NEGATIVE mg/dL / Nitrite: NEGATIVE   Leuk Esterase: NEGATIVE / RBC: 25-50 / WBC 2-5   Sq Epi: OCC / Non Sq Epi: x / Bacteria: FEW    CTAP pending

## 2018-06-25 NOTE — PROGRESS NOTE ADULT - ASSESSMENT
A: POD#3 s/p JODY/BSO for endometrial hyperplasia, anemia due to acute blood loss s?p 2untis PRBCS  P: F/U official CT report     IRon TID     Monitor vitals     Recommendations to follow

## 2018-06-25 NOTE — PROGRESS NOTE ADULT - SUBJECTIVE AND OBJECTIVE BOX
Patient evaluated at bedside. Reports incisional discomfort. Denies headache, shortness of breath, chest pain, palpitations. Ambulating without difficulty. +flatus. No dysuria.  No BM. Tolerating regular diet, no nausea or vomiting.  VS 97.9  P 79  /62    Heent nl  CV RRR  Lungs CTA b/l  Abd mildly distended. +BS, ecchymosis along incision line. no drainage Incision dry and intact.  Ext no CCE  H/H 6.8/20  BUN/CR 13/0.6  CT of abd/pelvis  No reproperitoneal hematoma  mild right hydroureter

## 2018-06-25 NOTE — PROGRESS NOTE ADULT - PROBLEM SELECTOR PLAN 1
CV: Hemodynamically stable, H/H downtrending, follow up final read CTAP to r/o sites of bleeding/hematoma  Pulm: Saturating well on RA. Increase incentive spirometry.  GI: Tolerating regular diet, passing flatus  : Voiding spontaneously, adequately  Heme: Continue HSQ/Venodynes for DVT ppx. Increase OOB. C/w Fe/Vit C/colace.   Neuro: Continue oral meds for pain control.    Marta, pgy3

## 2018-07-02 LAB — SURGICAL PATHOLOGY STUDY: SIGNIFICANT CHANGE UP

## 2019-07-20 PROBLEM — N85.01 BENIGN ENDOMETRIAL HYPERPLASIA: Chronic | Status: ACTIVE | Noted: 2018-06-22

## 2021-08-26 NOTE — BRIEF OPERATIVE NOTE - PRE-OP
Your pelvic ultrasound showed an enlarged cystic mass in your right ovary that measured at 4.3 x 3.5 x 2.9 cm.  Radiology recommends that you get this reevaluated by ultrasound or MRI in the next 8-12 weeks, could potentially represent malignancy such as ovarian cancer.  Please discuss this closely with your primary care doctor and have them arrange repeat imaging in 8-12-week timeframe.   <<-----Click on this checkbox to enter Pre-Op Dx

## 2022-06-09 NOTE — H&P PST ADULT - FALL HARM RISK CONCLUSION
Stable  Cont Flonase nasal spray BID  Avoid triggers  Return to clinic if worsening symptoms  Monitor  
Universal Safety Interventions
Regular Diet - No restrictions
Universal Safety Interventions

## 2023-08-05 NOTE — PATIENT PROFILE ADULT. - TEACHING/LEARNING FACTORS INFLUENCE READINESS TO LEARN
A 6 fr sheath was  inserted with ultrasound guidance into the left radial artery. Sheath insertion not delayed. none

## 2024-05-09 NOTE — PATIENT PROFILE ADULT. - AS SC BRADEN FRICTION
[Time Spent: ___ minutes] : I have spent [unfilled] minutes of time on the encounter.
(3) no apparent problem

## 2024-05-15 NOTE — PROGRESS NOTE ADULT - ASSESSMENT
A: POD#2 s/p TAHBSO, anemia due to acute blood loss clinically stable  P: 2units of PRBCS     F/U post transfusion      F/U official CT of abd/pelvis report     Plan of care discussed with patient and relative ( MD Dukes via telephone)     Monitor vitals     DC planning 6/25 if patient remains stable Bleeding that does not stop

## 2025-04-17 NOTE — PATIENT PROFILE ADULT. - TEACHING/LEARNING CULTURAL CONSIDERATIONS
Name: Marizol Jiménez      : 10/4/1927      MRN: [id number]  Encounter Provider: Alfreda Briseno DO  Encounter Date: 2025   Encounter department: [facility] [id number]   :  Assessment & Plan  Impaired fasting glucose  Sugar again wnl but A1c still in low IFG range, urged to watch sweets and keep active, recheck BW annually       Stage 3 chronic kidney disease, unspecified whether stage 3a or 3b CKD (HCC)  Lab Results   Component Value Date    EGFR 54 2025    EGFR 46 2024    EGFR 54 2024    CREATININE 0.90 2025    CREATININE 1.04 2024    CREATININE 0.90 2024   CKD stage 3 reviewed with pt, importance of BP/BS control as well as avoiding NSAIDs and dehydration, will follow, she is on an ACE       Dyslipidemia  LDL at goal, TG up a bit and HDL low - urged healthy diet and keep active, con't current statin, recheck FLP annually       Squamous cell carcinoma of left lung (HCC)  S/p radiation, has seen Rad Onc and has CT chest scheduled, will follow as well       Primary adenocarcinoma of ascending colon (HCC)  CEA down a bit, has repeat CEA and f/u with Colorectal surgery, call with GI symptoms, will follow as well       Iron deficiency anemia, unspecified iron deficiency anemia type    Iron and ferritin both nml but low nml and have trended down a bit, will keep off iron supplement for now and repeat in 4-6 mos - BW order given, will follow, call with any s/sx of GI bleeding  Orders:    Iron Panel (Includes Ferritin, Iron Sat%, Iron, and TIBC); Future    CBC and differential; Future    Primary hypertension  BP at goal, con't current meds, recheck BP in 4-6 mos         Colonoscopy  - no further repeat unless symptoms occur as per colorectal surgery    CEA     BW  (A1C 5.9)    She has stopped driving, has a friend that drives her around to [place] and takes the van to the [facility] center 2 x's a week       History of Present Illness   HPI Pt  here for follow up appt and BW results    BW results were d/w pt in detail: FBS/A1C 93/5.9, BUN/Cr 21/0.90 (GFR 54), rest of CMP/CBC/iron studies were normal (ferritin low nml at 35), FLP with  and HDL 42, TC and LDL at goal    Def of nml vs IFG vs DM was d/w pt in detail. Diet/exercise was reviewed - wgt down 1 lb from Sept 24.  She admits she has a sweet tooth.  She does not think she has a carb rich diet. She is not doing any formal exercise. She stopped going to yoga.  CKD stage 3 and importance of BS control as well as BP control, fluid intake and NSAID use were all reviewed.     Goal FLP was d/w pt in detail.  Diet/exercise reviewed as noted above.  She is taking her Atorvastatin daily as directed w/o Se.  She notes no stroke/TIA symptoms/CP.     Nml range for iron studies/ferritin reviewed.  She remains off all iron supplements at this time.  She notes no abd pain/BRBPR/black tarry stools.     Pt saw Colorectal Surgery (Dr. Reid) in Dec and Rad Onc in Feb 25 for f/u of Lung CA and Colon CA - OV notes reviewed.  She con't to have her CEA monitored (last CEA 8/24 was down at 2.5) every 6 mos or so and has CT Chest scheduled for Aug 25.     BP at goal today and meds were reviewed and no changes have occurred.  She denies missing doses of meds or SE with the meds.  She does check her BP outside the office and reports she is usually 120's/50's.  She notes no frequent Ha's/dizziness/double vision/CP.       Review of Systems   Constitutional:  Negative for chills, fever and unexpected weight change.   HENT:  Negative for congestion and sore throat.    Eyes:  Negative for pain and visual disturbance.   Respiratory:  Negative for cough, shortness of breath and wheezing.    Cardiovascular:  Negative for chest pain, palpitations and leg swelling.   Gastrointestinal:  Negative for abdominal pain, blood in stool, constipation, diarrhea, nausea and vomiting.   Genitourinary:  Negative for difficulty urinating,  "dysuria, hematuria, vaginal bleeding and vaginal pain.   Musculoskeletal:  Negative for back pain, gait problem and neck pain.   Skin:  Negative for rash and wound.   Neurological:  Negative for dizziness and headaches.   Hematological:  Does not bruise/bleed easily.   Psychiatric/Behavioral:  Negative for confusion and dysphoric mood.        Objective   /63 (BP Location: Left arm, Patient Position: Sitting, Cuff Size: Standard)   Pulse (!) 54   Temp (!) 96.5 °F (35.8 °C)   Ht 4' 9\" (1.448 m)   Wt 55.3 kg (122 lb)   SpO2 100%   BMI 26.40 kg/m²      Physical Exam  Vitals and nursing note reviewed.   Constitutional:       General: She is not in acute distress.     Appearance: She is well-developed. She is not ill-appearing.   HENT:      Head: Normocephalic and atraumatic.      Right Ear: External ear normal.      Left Ear: External ear normal.   Eyes:      General:         Right eye: No discharge.         Left eye: No discharge.      Conjunctiva/sclera: Conjunctivae normal.   Neck:      Thyroid: No thyromegaly.      Vascular: No carotid bruit.      Trachea: No tracheal deviation.   Cardiovascular:      Rate and Rhythm: Regular rhythm. Bradycardia present.      Heart sounds: Murmur heard.   Pulmonary:      Effort: Pulmonary effort is normal. No respiratory distress.      Breath sounds: Normal breath sounds. No wheezing, rhonchi or rales.   Abdominal:      General: There is no distension.      Palpations: Abdomen is soft.      Tenderness: There is no abdominal tenderness. There is no guarding or rebound.   Musculoskeletal:         General: No deformity or signs of injury.      Cervical back: Neck supple.   Lymphadenopathy:      Cervical: No cervical adenopathy.   Skin:     General: Skin is warm and dry.      Coloration: Skin is not pale.      Findings: No bruising or rash.   Neurological:      General: No focal deficit present.      Mental Status: She is alert. Mental status is at baseline.      Motor: No " abnormal muscle tone.      Gait: Gait normal.   Psychiatric:         Mood and Affect: Mood normal.         Behavior: Behavior normal.         Thought Content: Thought content normal.         Judgment: Judgment normal.          none